# Patient Record
Sex: MALE | Race: BLACK OR AFRICAN AMERICAN | Employment: FULL TIME | URBAN - METROPOLITAN AREA
[De-identification: names, ages, dates, MRNs, and addresses within clinical notes are randomized per-mention and may not be internally consistent; named-entity substitution may affect disease eponyms.]

---

## 2017-07-19 ENCOUNTER — HOSPITAL ENCOUNTER (OUTPATIENT)
Dept: PHYSICAL THERAPY | Age: 37
Discharge: HOME | End: 2017-07-19
Attending: FAMILY MEDICINE
Payer: COMMERCIAL

## 2017-07-19 DIAGNOSIS — M54.2 NECK PAIN: ICD-10-CM

## 2017-07-19 PROCEDURE — 97140 MANUAL THERAPY 1/> REGIONS: CPT

## 2017-07-19 PROCEDURE — 97162 PT EVAL MOD COMPLEX 30 MIN: CPT

## 2017-07-19 NOTE — PROGRESS NOTES
Cynthia Gonzalez  : [facility] [address]  [phone]   [phone]       OUTPATIENT PHYSICAL THERAPY:Initial Assessment 2017    ICD-10: Treatment Diagnosis: Cervicalgia (M54.2)         Pain in left arm (M79.602)  Precautions/Allergies:   Review of patient's allergies indicates no known allergies. Fall Risk Score: 1 (? 5 = High Risk)  MD Orders: Evaluate and treat MEDICAL/REFERRING DIAGNOSIS:  Neck pain [M54.2]   DATE OF ONSET: 3 weeks prior to initial eval  REFERRING PHYSICIAN: Lilia Moore MD  RETURN PHYSICIAN APPOINTMENT: unspecified   This section established at most recent assessment  INITIAL ASSESSMENT:  Paige Patterson is a 44 y.o. male who presents to physical therapy for sudden onset of cervical pain that began about 3 weeks prior to initial eval. This session, he demonstrated slightly decreased L shoulder strength/endurance, decreased cervical mobility with associated pain, multiple postural deficits, and decreased functional mobility as evident by a score of 18/50 on the Neck Disability Index (with higher scores indicating increased disability). Pt may benefit from skilled PT to address the above listed deficits to improve ability to perform pain-free ADLs/IADLs and to improve overall quality of life prior to discharge. PROBLEM LIST (Impacting functional limitations):  1. Decreased Strength  2. Decreased ADL/Functional Activities  3. Increased Pain  4. Decreased Activity Tolerance  5. Decreased Flexibility/Joint Mobility  6. Edema/Girth INTERVENTIONS PLANNED:  1. Balance Exercise  2. Bed Mobility  3. Cold  4. Electrical Stimulation  5. Family Education  6. Heat  7. Home Exercise Program (HEP)  8. Manual Therapy  9. Neuromuscular Re-education/Strengthening  10. Range of Motion (ROM)  11. Therapeutic Activites  12. Therapeutic Exercise/Strengthening  13.  Transcutaneous Electrical Nerve Stimulation (TENS)  14. Transfer Training  15. Ultrasound (US)   TREATMENT PLAN:  Effective Dates: 7/19/2017 TO 10/19/2017. Frequency/Duration: 2 times a week for 4 weeks  GOALS: (Goals have been discussed and agreed upon with patient.)  Discharge Goals: Time Frame: 4 weeks  Pt will be compliant and independent with HEP in order to increase cervical mobility and UE strength/endurance/mobility to improve functional mobility and overall quality of life. Pt will reduce score on Neck Disability Index to 10/50 in order to demonstrate improved function during activities of daily living. Pt will demonstrate increase in B cervical rotation AROM to 90% or more of normal with minimal to no increase in pain in order to improve functional mobility and improve ability to turn head while driving. Pt will report decrease in average pain level to 3/10 pain or less in order to demonstrate improved quality of life. Rehabilitation Potential For Stated Goals: Good  Regarding Treealexey Lal Lisa's therapy, I certify that the treatment plan above will be carried out by a therapist or under their direction. Thank you for this referral,  Alyssa Martinez DPT     Referring Physician Signature: Darnell Terrazas MD              Date                    HISTORY:   History of Present Injury/Illness (Reason for Referral): *History per pt or pt's family except where otherwise noted  Pt states he was in the shower about 3 weeks ago when he reached back with his R arm \"too fast\" and felt a pop. Pt states the pain continued for several days and was getting worse, so he went to the ER (didn't do anything according to pt), but went to MD next (who did x-rays), who said that it was \"whiplash\". Pt states pain medication and muscle relaxers were not helping, so that is when he was referred to physical therapy. Pt states sleeping is painful, and he cannot turn his head or cough without sharp pain going through his L arm (R side was unaffected).  Pt states even holding a bowl of cereal with his L hand and bringing a spoon up to his mouth with his R hand brings on pain. Pt states there is a constant pain in his L cervical spine (6/10 pain), but if he moves certain ways a sharp pain radiates throughout his L UE. Pain at worst in past week has been 8/10 (aggravating activities include turning head either direction, coughing, sitting up in bed from lying on back). Pain at best is 5/10 (eases pain by massaging neck). Past Medical History/Comorbidities:   Mr. Estephania Plasencia  has a past medical history of Chronic kidney disease (CKD), stage II (mild) (10/13/2010); Essential hypertension, malignant (10/11/2010); Hypertension; Morbid obesity ([facility] [address] (10/11/2010); EILEEN (obstructive sleep apnea) (10/11/2010); EILEEN (obstructive sleep apnea) (10/11/2010); Primary pulmonary hypertension ([facility] [address] (10/11/2010); Severe uncontrolled hypertension (10/11/2010); and Tobacco abuse (10/11/2010). Mr. Estephania Plasencia  has no past surgical history on file. Social History/Living Environment:    lives with mom (but pt states he is able to do all chores, the pain is just constant)  Prior Level of Function/Work/Activity:  Works in [employer] on [employer] (has difficulty backing up due to being unable to turn his head without pain); Dominant Side:         RIGHT  Other Clinical Tests:          X-ray of cervical spine  Previous Treatment Approaches:          Tried pain medication and muscle relaxers - did not help  Personal Factors:          Sex:  male        Age:  44 y.o. Current Medications:    Current Outpatient Prescriptions:     methylPREDNISolone (MEDROL DOSEPACK) 4 mg tablet, Take as directed in package. , Disp: 1 Dose Pack, Rfl: 0    sildenafil citrate (VIAGRA) 50 mg tablet, Take 1 Tab by mouth as needed. Indications: Erectile Dysfunction, Disp: 9 Tab, Rfl: 11    carisoprodol (SOMA) 350 mg tablet, Take 1 Tab by mouth every eight (8) hours as needed for Muscle Spasm(s).  Max Daily Amount: 1,050 mg., Disp: 21 Tab, Rfl: 0    HYDROcodone-acetaminophen (NORCO) 5-325 mg per tablet, Take 1 Tab by mouth every six (6) hours as needed for Pain for up to 30 days. Max Daily Amount: 4 Tabs. Indications: Pain, Disp: 28 Tab, Rfl: 0    cloNIDine HCl (CATAPRES) 0.1 mg tablet, Take 1 Tab by mouth two (2) times a day. Indications: Hypertension, Disp: 30 Tab, Rfl: 0    diclofenac potassium (CATAFLAM) 50 mg tablet, Take 1 Tab by mouth three (3) times daily. , Disp: 21 Tab, Rfl: 0    hydrocortisone (ANUSOL-HC) 2.5 % rectal cream, Insert  into rectum four (4) times daily. , Disp: 30 g, Rfl: 0    lisinopril-hydrochlorothiazide (PRINZIDE, ZESTORETIC) 20-12.5 mg per tablet, Take 2 Tabs by mouth daily. Indications: HYPERTENSION, Disp: 180 Tab, Rfl: 3    amLODIPine (NORVASC) 10 mg tablet, Take 1 Tab by mouth daily.  Indications: HYPERTENSION, Disp: 90 Tab, Rfl: 3   Date Last Reviewed:  7/19/2017   # of Personal Factors/Comorbidities that affect the Plan of Care: 1-2: MODERATE COMPLEXITY   EXAMINATION:   Initial assessment on 7/19/2017  Observation/Orthostatic Postural Assessment:          Kyphotic posture, forward head, rounded shoulders, posterior pelvic tilt in sitting  Palpation:          Significant tightness and tenderness throughout B upper traps/deltoids and levator scapulae; stiffness with central PA mobilizations noted throughout thoracic and cervical spine  ROM:    Initial measurement: (on 7/19/2017) Initial measurement: (on 7/19/2017) Reassessment measurement:  Reassessment measurement:    L UE AROM:  Shoulder flexion: [facility]  Shoulder abduction: WFL, but pain at end range in L cervical spine  Shoulder extension: [facility]  Shoulder ER: [facility]  Shoulder IR: level of T11 (pain in L upper trap radiating down L UE to elbow)    R UE AROM:  Shoulder flexion: [facility]  Shoulder abduction: [facility]  Shoulder extension: [facility]  Shoulder ER: [facility]  Shoulder IR: level of T8        Initial measurement: (on 7/19/2017) Reassessment measurement: Reassessment measurement: Cervical AROM  Flexion (% of normal): 70% (pain in L cervical spine)  Extension (% of normal): 30% (slight pain in L cervical spine)  L sidebending (% of normal): 50% (pain in L cervical spine with radiating pain into L upper trap)  R sidebending (% of normal): 70% (slight pain in L cervical spine)  L rotation (% of normal): 80% (pain in L cervical spine)  R rotation (% of normal): 85% (slight pain in L cervical spine)    Thoracic AROM  Flexion (% of normal): 80% (pain in L cervical spine)  Extension (% of normal):  50%   L sidebending (% of normal): 100%  R sidebending (% of normal): 100%  L rotation (% of normal): 100%  R rotation (% of normal): 100%            Strength:    Initial measurement: (on 7/19/2017) Initial measurement: (on 7/19/2017) Reassessment measurement:  Reassessment measurement:    L UE:  Shoulder flexion: 4/5 (slight pain in L upper trap)  Shoulder extension: 5/5  Shoulder abduction: 4/5  Shoulder adduction: 5/5  Shoulder IR: 5/5  Shoulder ER: 4/5  Elbow flexion: 5/5  Elbow extension: 5/5  Wrist flexion/extension: 5/5 R UE:  Shoulder flexion: 4+/5  Shoulder extension: 5/5  Shoulder abduction: 5/5  Shoulder adduction: 5/5  Shoulder IR: 5/5  Shoulder ER: 4+/5  Elbow flexion: 5/5  Elbow extension: 5/5  Wrist flexion/extension: 5/5       Special Tests:          Cervical compression/distraction: -  Spurlings: + on L for radiating pain down L upper trap, - on R  Neurological Screen:        Myotomes:  WFL        Dermatomes:  Slightly decreased sensation at C4 on L side     Body Structures Involved:  1. Nerves  2. Bones  3. Joints  4. Muscles  5. Ligaments Body Functions Affected:  1. Sensory/Pain  2. Neuromusculoskeletal  3. Movement Related Activities and Participation Affected:  1. General Tasks and Demands  2. Mobility  3. Self Care  4. Domestic Life  5. Interpersonal Interactions and Relationships  6.  Community, Social and [place]   # of elements that affect the Plan of Care: 4+: HIGH COMPLEXITY   CLINICAL PRESENTATION:   Presentation: Evolving clinical presentation with changing clinical characteristics: MODERATE COMPLEXITY   CLINICAL DECISION MAKING:   Outcome Measure: Tool Used: Neck Disability Index (NDI)  Score:  Initial: 18/50  Most Recent: X/50 (Date: -- )   Interpretation of Score: The Neck Disability Index is a revised form of the Oswestry Low Back Pain Index and is designed to measure the activities of daily living in person's with neck pain. Each section is scored on a 0-5 scale, 5 representing the greatest disability. The scores of each section are added together for a total score of 50. Score 0 1-10 11-20 21-30 31-40 41-49 50   Modifier CH CI CJ CK CL CM CN     Payor: BLUE CROSS / Plan: [place] BLUE CROSS [facility] / Product Type: PPO /   Medical Necessity:   · Patient is expected to demonstrate progress in strength, range of motion and functional technique to improve ability to perform pain-free ADLs/IADLs and to improve overall quality of life. · Patient demonstrates good rehab potential due to higher previous functional level. Reason for Services/Other Comments:  · Patient continues to require modification of therapeutic interventions to increase complexity of exercises. Use of outcome tool(s) and clinical judgement create a POC that gives a: Questionable prediction of patient's progress: MODERATE COMPLEXITY   TREATMENT:   (In addition to Assessment/Re-Assessment sessions the following treatments were rendered)  Subjective comments at beginning of session: See history above. MANUAL THERAPY: (17 minutes): Joint mobilization and Soft tissue mobilization was utilized and necessary because of the patient's restricted joint motion, loss of articular motion and restricted motion of soft tissue. With pt in supine position, upward glide mobilizations (grade 2-3) performed to C3-5 to improve cervical mobility and reduce muscular tightness and pain.  With pt in prone position, central PA mobilizations (grade 3-4) performed to C7-T7 to improve thoracic and cervical mobility and reduce tightness and pain. With pt in sitting position, muscle energy technique performed with stabilization at C2 (cues for pt to perform resisted R cervical rotation while positioned in maximal L rotation, followed by further passive movement into additional L cervical rotation) to improve cervical mobility. With pt in sitting position, active movement performed into maximal L cervical rotation multiple bouts with continuous unilateral PA mobilization at L C4 to improve cervical mobility and reduce pain. Treatment/Session Assessment:  See assessment above. · Pain/ Symptoms: Initial:   6/10 Post Session:  6/10 ·   Compliance with Program/Exercises: Will assess as treatment progresses. · Recommendations/Intent for next treatment session: \"Next visit will focus on advancements to more challenging activities and reduction in assistance provided\".   Total Treatment Duration:  PT Patient Time In/Time Out  Time In: 1000  Time Out: 3 Binh Kwong, LEO

## 2017-07-19 NOTE — PROGRESS NOTES
Ambulatory/Rehab Services H2 Model Falls Risk Assessment    Risk Factor Pts. ·   Confusion/Disorientation/Impulsivity  []    4 ·   Symptomatic Depression  []   2 ·   Altered Elimination  []   1 ·   Dizziness/Vertigo  []   1 ·   Gender (Male)  [x]   1 ·   Any administered antiepileptics (anticonvulsants):  []   2 ·   Any administered benzodiazepines:  []   1 ·   Visual Impairment (specify):  []   1 ·   Portable Oxygen Use  []   1 ·   Orthostatic ? BP  []   1 ·   History of Recent Falls (within 3 mos.)  []   5     Ability to Rise from Chair (choose one) Pts. ·   Ability to rise in a single movement  [x]   0 ·   Pushes up, successful in one attempt  []   1 ·   Multiple attempts, but successful  []   3 ·   Unable to rise without assistance  []   4   Total: (5 or greater = High Risk) 1     Falls Prevention Plan:   []                Physical Limitations to Exercise (specify):   []                Mobility Assistance Device (type):   []                Exercise/Equipment Adaptation (specify):    ©2010 [facility] [address] Patent #[id number].  Federal Law prohibits the replication, distribution or use without written permission from [facility] [employer]

## 2017-07-26 ENCOUNTER — HOSPITAL ENCOUNTER (OUTPATIENT)
Dept: PHYSICAL THERAPY | Age: 37
Discharge: HOME | End: 2017-07-26
Attending: FAMILY MEDICINE
Payer: COMMERCIAL

## 2017-07-26 PROCEDURE — 97110 THERAPEUTIC EXERCISES: CPT

## 2017-07-26 PROCEDURE — 97140 MANUAL THERAPY 1/> REGIONS: CPT

## 2017-07-26 NOTE — PROGRESS NOTES
Aydin Gonzalez  : [facility] [address]  [phone]   [phone]       OUTPATIENT PHYSICAL THERAPY:Daily Note 2017    ICD-10: Treatment Diagnosis: Cervicalgia (M54.2)         Pain in left arm (M79.602)  Precautions/Allergies:   Review of patient's allergies indicates no known allergies. Fall Risk Score: 1 (? 5 = High Risk)  MD Orders: Evaluate and treat MEDICAL/REFERRING DIAGNOSIS:  Neck pain [M54.2]   DATE OF ONSET: 3 weeks prior to initial eval  REFERRING PHYSICIAN: Tristin Lawson MD  RETURN PHYSICIAN APPOINTMENT: unspecified   This section established at most recent assessment  INITIAL ASSESSMENT:  Cl Kincaid is a 44 y.o. male who presents to physical therapy for sudden onset of cervical pain that began about 3 weeks prior to initial eval. This session, he demonstrated slightly decreased L shoulder strength/endurance, decreased cervical mobility with associated pain, multiple postural deficits, and decreased functional mobility as evident by a score of 18/50 on the Neck Disability Index (with higher scores indicating increased disability). Pt may benefit from skilled PT to address the above listed deficits to improve ability to perform pain-free ADLs/IADLs and to improve overall quality of life prior to discharge. PROBLEM LIST (Impacting functional limitations):  1. Decreased Strength  2. Decreased ADL/Functional Activities  3. Increased Pain  4. Decreased Activity Tolerance  5. Decreased Flexibility/Joint Mobility  6. Edema/Girth INTERVENTIONS PLANNED:  1. Balance Exercise  2. Bed Mobility  3. Cold  4. Electrical Stimulation  5. Family Education  6. Heat  7. Home Exercise Program (HEP)  8. Manual Therapy  9. Neuromuscular Re-education/Strengthening  10. Range of Motion (ROM)  11. Therapeutic Activites  12. Therapeutic Exercise/Strengthening  13.  Transcutaneous Electrical Nerve Stimulation (TENS)  14. Transfer Training  15. Ultrasound (US)   TREATMENT PLAN:  Effective Dates: 7/19/2017 TO 10/19/2017. Frequency/Duration: 2 times a week for 4 weeks  GOALS: (Goals have been discussed and agreed upon with patient.)  Discharge Goals: Time Frame: 4 weeks  Pt will be compliant and independent with HEP in order to increase cervical mobility and UE strength/endurance/mobility to improve functional mobility and overall quality of life. Pt will reduce score on Neck Disability Index to 10/50 in order to demonstrate improved function during activities of daily living. Pt will demonstrate increase in B cervical rotation AROM to 90% or more of normal with minimal to no increase in pain in order to improve functional mobility and improve ability to turn head while driving. Pt will report decrease in average pain level to 3/10 pain or less in order to demonstrate improved quality of life. Rehabilitation Potential For Stated Goals: Good  Regarding Gus Gonzalez's therapy, I certify that the treatment plan above will be carried out by a therapist or under their direction. Thank you for this referral,  Jessica Calzada DPT     Referring Physician Signature: Max Ybarra MD              Date                    HISTORY:   History of Present Injury/Illness (Reason for Referral): *History per pt or pt's family except where otherwise noted  Pt states he was in the shower about 3 weeks ago when he reached back with his R arm \"too fast\" and felt a pop. Pt states the pain continued for several days and was getting worse, so he went to the ER (didn't do anything according to pt), but went to MD next (who did x-rays), who said that it was \"whiplash\". Pt states pain medication and muscle relaxers were not helping, so that is when he was referred to physical therapy. Pt states sleeping is painful, and he cannot turn his head or cough without sharp pain going through his L arm (R side was unaffected).  Pt states even holding a bowl of cereal with his L hand and bringing a spoon up to his mouth with his R hand brings on pain. Pt states there is a constant pain in his L cervical spine (6/10 pain), but if he moves certain ways a sharp pain radiates throughout his L UE. Pain at worst in past week has been 8/10 (aggravating activities include turning head either direction, coughing, sitting up in bed from lying on back). Pain at best is 5/10 (eases pain by massaging neck). Past Medical History/Comorbidities:   Mr. Estephania Plasencia  has a past medical history of Chronic kidney disease (CKD), stage II (mild) (10/13/2010); Essential hypertension, malignant (10/11/2010); Hypertension; Morbid obesity ([facility] [address] (10/11/2010); EILEEN (obstructive sleep apnea) (10/11/2010); EILEEN (obstructive sleep apnea) (10/11/2010); Primary pulmonary hypertension ([facility] [address] (10/11/2010); Severe uncontrolled hypertension (10/11/2010); and Tobacco abuse (10/11/2010). Mr. Estephania Plasencia  has no past surgical history on file. Social History/Living Environment:    lives with mom (but pt states he is able to do all chores, the pain is just constant)  Prior Level of Function/Work/Activity:  Works in [employer] on [employer] (has difficulty backing up due to being unable to turn his head without pain); Dominant Side:         RIGHT  Other Clinical Tests:          X-ray of cervical spine  Previous Treatment Approaches:          Tried pain medication and muscle relaxers - did not help  Personal Factors:          Sex:  male        Age:  44 y.o. Current Medications:    Current Outpatient Prescriptions:     methylPREDNISolone (MEDROL DOSEPACK) 4 mg tablet, Take as directed in package. , Disp: 1 Dose Pack, Rfl: 0    sildenafil citrate (VIAGRA) 50 mg tablet, Take 1 Tab by mouth as needed. Indications: Erectile Dysfunction, Disp: 9 Tab, Rfl: 11    carisoprodol (SOMA) 350 mg tablet, Take 1 Tab by mouth every eight (8) hours as needed for Muscle Spasm(s).  Max Daily Amount: 1,050 mg., Disp: 21 Tab, Rfl: 0    HYDROcodone-acetaminophen (NORCO) 5-325 mg per tablet, Take 1 Tab by mouth every six (6) hours as needed for Pain for up to 30 days. Max Daily Amount: 4 Tabs. Indications: Pain, Disp: 28 Tab, Rfl: 0    cloNIDine HCl (CATAPRES) 0.1 mg tablet, Take 1 Tab by mouth two (2) times a day. Indications: Hypertension, Disp: 30 Tab, Rfl: 0    diclofenac potassium (CATAFLAM) 50 mg tablet, Take 1 Tab by mouth three (3) times daily. , Disp: 21 Tab, Rfl: 0    hydrocortisone (ANUSOL-HC) 2.5 % rectal cream, Insert  into rectum four (4) times daily. , Disp: 30 g, Rfl: 0    lisinopril-hydrochlorothiazide (PRINZIDE, ZESTORETIC) 20-12.5 mg per tablet, Take 2 Tabs by mouth daily. Indications: HYPERTENSION, Disp: 180 Tab, Rfl: 3    amLODIPine (NORVASC) 10 mg tablet, Take 1 Tab by mouth daily.  Indications: HYPERTENSION, Disp: 90 Tab, Rfl: 3   Date Last Reviewed:  7/26/2017   # of Personal Factors/Comorbidities that affect the Plan of Care: 1-2: MODERATE COMPLEXITY   EXAMINATION:   Initial assessment on 7/19/2017  Observation/Orthostatic Postural Assessment:          Kyphotic posture, forward head, rounded shoulders, posterior pelvic tilt in sitting  Palpation:          Significant tightness and tenderness throughout B upper traps/deltoids and levator scapulae; stiffness with central PA mobilizations noted throughout thoracic and cervical spine  ROM:    Initial measurement: (on 7/19/2017) Initial measurement: (on 7/19/2017) Reassessment measurement:  Reassessment measurement:    L UE AROM:  Shoulder flexion: [facility]  Shoulder abduction: WFL, but pain at end range in L cervical spine  Shoulder extension: [facility]  Shoulder ER: [facility]  Shoulder IR: level of T11 (pain in L upper trap radiating down L UE to elbow)    R UE AROM:  Shoulder flexion: [facility]  Shoulder abduction: [facility]  Shoulder extension: [facility]  Shoulder ER: [facility]  Shoulder IR: level of T8        Initial measurement: (on 7/19/2017) Reassessment measurement: Reassessment measurement: Cervical AROM  Flexion (% of normal): 70% (pain in L cervical spine)  Extension (% of normal): 30% (slight pain in L cervical spine)  L sidebending (% of normal): 50% (pain in L cervical spine with radiating pain into L upper trap)  R sidebending (% of normal): 70% (slight pain in L cervical spine)  L rotation (% of normal): 80% (pain in L cervical spine)  R rotation (% of normal): 85% (slight pain in L cervical spine)    Thoracic AROM  Flexion (% of normal): 80% (pain in L cervical spine)  Extension (% of normal):  50%   L sidebending (% of normal): 100%  R sidebending (% of normal): 100%  L rotation (% of normal): 100%  R rotation (% of normal): 100%            Strength:    Initial measurement: (on 7/19/2017) Initial measurement: (on 7/19/2017) Reassessment measurement:  Reassessment measurement:    L UE:  Shoulder flexion: 4/5 (slight pain in L upper trap)  Shoulder extension: 5/5  Shoulder abduction: 4/5  Shoulder adduction: 5/5  Shoulder IR: 5/5  Shoulder ER: 4/5  Elbow flexion: 5/5  Elbow extension: 5/5  Wrist flexion/extension: 5/5 R UE:  Shoulder flexion: 4+/5  Shoulder extension: 5/5  Shoulder abduction: 5/5  Shoulder adduction: 5/5  Shoulder IR: 5/5  Shoulder ER: 4+/5  Elbow flexion: 5/5  Elbow extension: 5/5  Wrist flexion/extension: 5/5       Special Tests:          Cervical compression/distraction: -  Spurlings: + on L for radiating pain down L upper trap, - on R  Neurological Screen:        Myotomes:  WFL        Dermatomes:  Slightly decreased sensation at C4 on L side     Body Structures Involved:  1. Nerves  2. Bones  3. Joints  4. Muscles  5. Ligaments Body Functions Affected:  1. Sensory/Pain  2. Neuromusculoskeletal  3. Movement Related Activities and Participation Affected:  1. General Tasks and Demands  2. Mobility  3. Self Care  4. Domestic Life  5. Interpersonal Interactions and Relationships  6.  Community, Social and [place]   # of elements that affect the Plan of Care: 4+: HIGH COMPLEXITY   CLINICAL PRESENTATION:   Presentation: Evolving clinical presentation with changing clinical characteristics: MODERATE COMPLEXITY   CLINICAL DECISION MAKING:   Outcome Measure: Tool Used: Neck Disability Index (NDI)  Score:  Initial: 18/50  Most Recent: X/50 (Date: -- )   Interpretation of Score: The Neck Disability Index is a revised form of the Oswestry Low Back Pain Index and is designed to measure the activities of daily living in person's with neck pain. Each section is scored on a 0-5 scale, 5 representing the greatest disability. The scores of each section are added together for a total score of 50. Score 0 1-10 11-20 21-30 31-40 41-49 50   Modifier CH CI CJ CK CL CM CN     Payor: BLUE CROSS / Plan: [place] BLUE CROSS [facility] / Product Type: PPO /   Medical Necessity:   · Patient is expected to demonstrate progress in strength, range of motion and functional technique to improve ability to perform pain-free ADLs/IADLs and to improve overall quality of life. · Patient demonstrates good rehab potential due to higher previous functional level. Reason for Services/Other Comments:  · Patient continues to require modification of therapeutic interventions to increase complexity of exercises. Use of outcome tool(s) and clinical judgement create a POC that gives a: Questionable prediction of patient's progress: MODERATE COMPLEXITY   TREATMENT:   (In addition to Assessment/Re-Assessment sessions the following treatments were rendered)  Subjective comments at beginning of session: Pt stating slightly decreased pain this session. Therapeutic Exercise: ( 8 minutes):  Exercises per grid below to improve mobility, strength and dynamic movement of neck - both sides to improve functional lifting, carrying, reaching and overhead activites. Required minimal visual, verbal and manual cues to promote proper body alignment, promote proper body posture and promote proper body mechanics.   Progressed resistance, range, repetitions and complexity of movement as indicated. Date:  7/26/2017 Date:   Date:   Activity/Exercise Parameters Parameters    UBE L2 resistance; 4 minutes forward and 2 minutes backward to increase strength/endurance     Lateral cervical flexion in sitting To R side only x 30 seconds x 3 reps with cues to only go within pain-free ROM                                     MANUAL THERAPY: (31 minutes): Joint mobilization and Soft tissue mobilization was utilized and necessary because of the patient's restricted joint motion, loss of articular motion and restricted motion of soft tissue. With pt in supine position, downward glide mobilizations (grade 2-3) performed to C6-7 with and without mobilization into movement to improve cervical mobility and reduce muscular tightness and pain. With pt in supine position with B LEs supported on wedge, STM and trigger point release to L>R upper traps/deltoids, levator scapulae, and cervical/thoracic paraspinals and gentle manual cervical distraction to reduce muscular tightness and pain. Treatment/Session Assessment:  Pt demonstrating improved cervical mobility with decreased pain at end of session. · Pain/ Symptoms: Initial:   5/10 Post Session:  4/10 ·   Compliance with Program/Exercises: Will assess as treatment progresses. · Recommendations/Intent for next treatment session: \"Next visit will focus on advancements to more challenging activities and reduction in assistance provided\".   Total Treatment Duration:  PT Patient Time In/Time Out  Time In: 1017  Time Out: 1008 [facility]

## 2017-07-28 ENCOUNTER — HOSPITAL ENCOUNTER (OUTPATIENT)
Dept: PHYSICAL THERAPY | Age: 37
Discharge: HOME | End: 2017-07-28
Attending: FAMILY MEDICINE
Payer: COMMERCIAL

## 2017-07-28 PROCEDURE — 97140 MANUAL THERAPY 1/> REGIONS: CPT

## 2017-07-28 PROCEDURE — 97110 THERAPEUTIC EXERCISES: CPT

## 2017-07-28 NOTE — PROGRESS NOTES
Teagan Gonzalez  : [facility] [address]  [phone]   [phone]       OUTPATIENT PHYSICAL THERAPY:Daily Note 2017    ICD-10: Treatment Diagnosis: Cervicalgia (M54.2)         Pain in left arm (M79.602)  Precautions/Allergies:   Review of patient's allergies indicates no known allergies. Fall Risk Score: 1 (? 5 = High Risk)  MD Orders: Evaluate and treat MEDICAL/REFERRING DIAGNOSIS:  Neck pain [M54.2]   DATE OF ONSET: 3 weeks prior to initial eval  REFERRING PHYSICIAN: Bessy Mcfarland MD  RETURN PHYSICIAN APPOINTMENT: unspecified   This section established at most recent assessment  INITIAL ASSESSMENT:  Urban Klein is a 44 y.o. male who presents to physical therapy for sudden onset of cervical pain that began about 3 weeks prior to initial eval. This session, he demonstrated slightly decreased L shoulder strength/endurance, decreased cervical mobility with associated pain, multiple postural deficits, and decreased functional mobility as evident by a score of 18/50 on the Neck Disability Index (with higher scores indicating increased disability). Pt may benefit from skilled PT to address the above listed deficits to improve ability to perform pain-free ADLs/IADLs and to improve overall quality of life prior to discharge. PROBLEM LIST (Impacting functional limitations):  1. Decreased Strength  2. Decreased ADL/Functional Activities  3. Increased Pain  4. Decreased Activity Tolerance  5. Decreased Flexibility/Joint Mobility  6. Edema/Girth INTERVENTIONS PLANNED:  1. Balance Exercise  2. Bed Mobility  3. Cold  4. Electrical Stimulation  5. Family Education  6. Heat  7. Home Exercise Program (HEP)  8. Manual Therapy  9. Neuromuscular Re-education/Strengthening  10. Range of Motion (ROM)  11. Therapeutic Activites  12. Therapeutic Exercise/Strengthening  13.  Transcutaneous Electrical Nerve Stimulation (TENS)  14. Transfer Training  15. Ultrasound (US)   TREATMENT PLAN:  Effective Dates: 7/19/2017 TO 10/19/2017. Frequency/Duration: 2 times a week for 4 weeks  GOALS: (Goals have been discussed and agreed upon with patient.)  Discharge Goals: Time Frame: 4 weeks  Pt will be compliant and independent with HEP in order to increase cervical mobility and UE strength/endurance/mobility to improve functional mobility and overall quality of life. Pt will reduce score on Neck Disability Index to 10/50 in order to demonstrate improved function during activities of daily living. Pt will demonstrate increase in B cervical rotation AROM to 90% or more of normal with minimal to no increase in pain in order to improve functional mobility and improve ability to turn head while driving. Pt will report decrease in average pain level to 3/10 pain or less in order to demonstrate improved quality of life. Rehabilitation Potential For Stated Goals: Good  Regarding Cher Alvarez Lisa's therapy, I certify that the treatment plan above will be carried out by a therapist or under their direction. Thank you for this referral,  Prince Kehr, PTA     Referring Physician Signature: Fabi Jean MD              Date                    HISTORY:   History of Present Injury/Illness (Reason for Referral): *History per pt or pt's family except where otherwise noted  Pt states he was in the shower about 3 weeks ago when he reached back with his R arm \"too fast\" and felt a pop. Pt states the pain continued for several days and was getting worse, so he went to the ER (didn't do anything according to pt), but went to MD next (who did x-rays), who said that it was \"whiplash\". Pt states pain medication and muscle relaxers were not helping, so that is when he was referred to physical therapy. Pt states sleeping is painful, and he cannot turn his head or cough without sharp pain going through his L arm (R side was unaffected).  Pt states even holding a bowl of cereal with his L hand and bringing a spoon up to his mouth with his R hand brings on pain. Pt states there is a constant pain in his L cervical spine (6/10 pain), but if he moves certain ways a sharp pain radiates throughout his L UE. Pain at worst in past week has been 8/10 (aggravating activities include turning head either direction, coughing, sitting up in bed from lying on back). Pain at best is 5/10 (eases pain by massaging neck). Past Medical History/Comorbidities:   Mr. Val Awad  has a past medical history of Chronic kidney disease (CKD), stage II (mild) (10/13/2010); Essential hypertension, malignant (10/11/2010); Hypertension; Morbid obesity ([facility] [address] (10/11/2010); EILEEN (obstructive sleep apnea) (10/11/2010); EILEEN (obstructive sleep apnea) (10/11/2010); Primary pulmonary hypertension ([facility] [address] (10/11/2010); Severe uncontrolled hypertension (10/11/2010); and Tobacco abuse (10/11/2010). Mr. Val Awad  has no past surgical history on file. Social History/Living Environment:    lives with mom (but pt states he is able to do all chores, the pain is just constant)  Prior Level of Function/Work/Activity:  Works in [employer] on [employer] (has difficulty backing up due to being unable to turn his head without pain); Dominant Side:         RIGHT  Other Clinical Tests:          X-ray of cervical spine  Previous Treatment Approaches:          Tried pain medication and muscle relaxers - did not help  Personal Factors:          Sex:  male        Age:  44 y.o. Current Medications:    Current Outpatient Prescriptions:     methylPREDNISolone (MEDROL DOSEPACK) 4 mg tablet, Take as directed in package. , Disp: 1 Dose Pack, Rfl: 0    sildenafil citrate (VIAGRA) 50 mg tablet, Take 1 Tab by mouth as needed. Indications: Erectile Dysfunction, Disp: 9 Tab, Rfl: 11    carisoprodol (SOMA) 350 mg tablet, Take 1 Tab by mouth every eight (8) hours as needed for Muscle Spasm(s).  Max Daily Amount: 1,050 mg., Disp: 21 Tab, Rfl: 0    HYDROcodone-acetaminophen (NORCO) 5-325 mg per tablet, Take 1 Tab by mouth every six (6) hours as needed for Pain for up to 30 days. Max Daily Amount: 4 Tabs. Indications: Pain, Disp: 28 Tab, Rfl: 0    cloNIDine HCl (CATAPRES) 0.1 mg tablet, Take 1 Tab by mouth two (2) times a day. Indications: Hypertension, Disp: 30 Tab, Rfl: 0    diclofenac potassium (CATAFLAM) 50 mg tablet, Take 1 Tab by mouth three (3) times daily. , Disp: 21 Tab, Rfl: 0    hydrocortisone (ANUSOL-HC) 2.5 % rectal cream, Insert  into rectum four (4) times daily. , Disp: 30 g, Rfl: 0    lisinopril-hydrochlorothiazide (PRINZIDE, ZESTORETIC) 20-12.5 mg per tablet, Take 2 Tabs by mouth daily. Indications: HYPERTENSION, Disp: 180 Tab, Rfl: 3    amLODIPine (NORVASC) 10 mg tablet, Take 1 Tab by mouth daily.  Indications: HYPERTENSION, Disp: 90 Tab, Rfl: 3   Date Last Reviewed:  7/28/2017   # of Personal Factors/Comorbidities that affect the Plan of Care: 1-2: MODERATE COMPLEXITY   EXAMINATION:   Initial assessment on 7/19/2017  Observation/Orthostatic Postural Assessment:          Kyphotic posture, forward head, rounded shoulders, posterior pelvic tilt in sitting  Palpation:          Significant tightness and tenderness throughout B upper traps/deltoids and levator scapulae; stiffness with central PA mobilizations noted throughout thoracic and cervical spine  ROM:    Initial measurement: (on 7/19/2017) Initial measurement: (on 7/19/2017) Reassessment measurement:  Reassessment measurement:    L UE AROM:  Shoulder flexion: [facility]  Shoulder abduction: WFL, but pain at end range in L cervical spine  Shoulder extension: [facility]  Shoulder ER: [facility]  Shoulder IR: level of T11 (pain in L upper trap radiating down L UE to elbow)    R UE AROM:  Shoulder flexion: [facility]  Shoulder abduction: [facility]  Shoulder extension: [facility]  Shoulder ER: [facility]  Shoulder IR: level of T8        Initial measurement: (on 7/19/2017) Reassessment measurement: Reassessment measurement: Cervical AROM  Flexion (% of normal): 70% (pain in L cervical spine)  Extension (% of normal): 30% (slight pain in L cervical spine)  L sidebending (% of normal): 50% (pain in L cervical spine with radiating pain into L upper trap)  R sidebending (% of normal): 70% (slight pain in L cervical spine)  L rotation (% of normal): 80% (pain in L cervical spine)  R rotation (% of normal): 85% (slight pain in L cervical spine)    Thoracic AROM  Flexion (% of normal): 80% (pain in L cervical spine)  Extension (% of normal):  50%   L sidebending (% of normal): 100%  R sidebending (% of normal): 100%  L rotation (% of normal): 100%  R rotation (% of normal): 100%            Strength:    Initial measurement: (on 7/19/2017) Initial measurement: (on 7/19/2017) Reassessment measurement:  Reassessment measurement:    L UE:  Shoulder flexion: 4/5 (slight pain in L upper trap)  Shoulder extension: 5/5  Shoulder abduction: 4/5  Shoulder adduction: 5/5  Shoulder IR: 5/5  Shoulder ER: 4/5  Elbow flexion: 5/5  Elbow extension: 5/5  Wrist flexion/extension: 5/5 R UE:  Shoulder flexion: 4+/5  Shoulder extension: 5/5  Shoulder abduction: 5/5  Shoulder adduction: 5/5  Shoulder IR: 5/5  Shoulder ER: 4+/5  Elbow flexion: 5/5  Elbow extension: 5/5  Wrist flexion/extension: 5/5       Special Tests:          Cervical compression/distraction: -  Spurlings: + on L for radiating pain down L upper trap, - on R  Neurological Screen:        Myotomes:  WFL        Dermatomes:  Slightly decreased sensation at C4 on L side     Body Structures Involved:  1. Nerves  2. Bones  3. Joints  4. Muscles  5. Ligaments Body Functions Affected:  1. Sensory/Pain  2. Neuromusculoskeletal  3. Movement Related Activities and Participation Affected:  1. General Tasks and Demands  2. Mobility  3. Self Care  4. Domestic Life  5. Interpersonal Interactions and Relationships  6.  Community, Social and [place]   # of elements that affect the Plan of Care: 4+: HIGH COMPLEXITY   CLINICAL PRESENTATION:   Presentation: Evolving clinical presentation with changing clinical characteristics: MODERATE COMPLEXITY   CLINICAL DECISION MAKING:   Outcome Measure: Tool Used: Neck Disability Index (NDI)  Score:  Initial: 18/50  Most Recent: X/50 (Date: -- )   Interpretation of Score: The Neck Disability Index is a revised form of the Oswestry Low Back Pain Index and is designed to measure the activities of daily living in person's with neck pain. Each section is scored on a 0-5 scale, 5 representing the greatest disability. The scores of each section are added together for a total score of 50. Score 0 1-10 11-20 21-30 31-40 41-49 50   Modifier CH CI CJ CK CL CM CN     Payor: BLUE CROSS / Plan: [place] BLUE CROSS [facility] / Product Type: PPO /   Medical Necessity:   · Patient is expected to demonstrate progress in strength, range of motion and functional technique to improve ability to perform pain-free ADLs/IADLs and to improve overall quality of life. · Patient demonstrates good rehab potential due to higher previous functional level. Reason for Services/Other Comments:  · Patient continues to require modification of therapeutic interventions to increase complexity of exercises. Use of outcome tool(s) and clinical judgement create a POC that gives a: Questionable prediction of patient's progress: MODERATE COMPLEXITY   TREATMENT:   (In addition to Assessment/Re-Assessment sessions the following treatments were rendered)  Subjective comments at beginning of session: Pt reports pain continues in left upper trap/neck area and left upper back. Patient reports pain level is     Therapeutic Exercise: ( 10 minutes):  Exercises per grid below to improve mobility, strength and dynamic movement of neck - both sides to improve functional lifting, carrying, reaching and overhead activites.   Required minimal visual, verbal and manual cues to promote proper body alignment, promote proper body posture and promote proper body mechanics. Progressed resistance, range, repetitions and complexity of movement as indicated. Date:  7/26/2017 Date:  7/28/17 Date:   Activity/Exercise Parameters Parameters    UBE L2 resistance; 4 minutes forward and 2 minutes backward to increase strength/endurance Level 2   X 4 minutes forward    Lateral cervical flexion in sitting To R side only x 30 seconds x 3 reps with cues to only go within pain-free ROM     Cervical rotation  5 x 5 second hold B in comfortable range      Cervical flexion and extension  5 x 5 second hold in comfortable range      Cervical side bend  5 x 5 second hold B in comfortable range                  MODALITIES: (20 minutes total) patient sitting with left arm supported for moist heat to left upper trap area prior to manual for 10 minutes to help decrease pain and tightness. After manual patient supine with knee wedge in place for ice pack to left upper trap and upper back x 10 minutes to help decrease pain. MANUAL THERAPY: (30 minutes): Patient sitting with left arm supported for STM with hands and guasha tools to left upper trap and upper back and then with weighted bar trigger point release to right upper trap area and then patient prone for STM with guasha tools, weight bar and hands to scapular border and upper back . Weight bar used with rolling technique. Patient tolerated all this well for decrease tightness and pain and improved mobility without increased pain and with improved mobility. Continue per plan of care. Treatment/Session Assessment:  Pt demonstrating improved cervical mobility with decreased pain at end of session. · Pain/ Symptoms: Initial:   6/10 Post Session:  4/10 ·   Compliance with Program/Exercises: Will assess as treatment progresses. · Recommendations/Intent for next treatment session: \"Next visit will focus on advancements to more challenging activities and reduction in assistance provided\".   Total Treatment Duration:  PT Patient Time In/Time Out  Time In: 1345  Time Out: [address], PTA

## 2017-07-31 ENCOUNTER — HOSPITAL ENCOUNTER (OUTPATIENT)
Dept: PHYSICAL THERAPY | Age: 37
Discharge: HOME | End: 2017-07-31
Attending: FAMILY MEDICINE
Payer: COMMERCIAL

## 2017-07-31 PROCEDURE — 97110 THERAPEUTIC EXERCISES: CPT

## 2017-07-31 PROCEDURE — 97140 MANUAL THERAPY 1/> REGIONS: CPT

## 2017-07-31 NOTE — PROGRESS NOTES
Charu Gonzalez  : [facility] [address]  [phone]   [phone]       OUTPATIENT PHYSICAL THERAPY:Daily Note 2017    ICD-10: Treatment Diagnosis: Cervicalgia (M54.2)         Pain in left arm (M79.602)  Precautions/Allergies:   Review of patient's allergies indicates no known allergies. Fall Risk Score: 1 (? 5 = High Risk)  MD Orders: Evaluate and treat MEDICAL/REFERRING DIAGNOSIS:  Neck pain [M54.2]   DATE OF ONSET: 3 weeks prior to initial eval  REFERRING PHYSICIAN: Iván Matson MD  RETURN PHYSICIAN APPOINTMENT: unspecified   This section established at most recent assessment  INITIAL ASSESSMENT:  Dilshad Lowry is a 44 y.o. male who presents to physical therapy for sudden onset of cervical pain that began about 3 weeks prior to initial eval. This session, he demonstrated slightly decreased L shoulder strength/endurance, decreased cervical mobility with associated pain, multiple postural deficits, and decreased functional mobility as evident by a score of 18/50 on the Neck Disability Index (with higher scores indicating increased disability). Pt may benefit from skilled PT to address the above listed deficits to improve ability to perform pain-free ADLs/IADLs and to improve overall quality of life prior to discharge. PROBLEM LIST (Impacting functional limitations):  1. Decreased Strength  2. Decreased ADL/Functional Activities  3. Increased Pain  4. Decreased Activity Tolerance  5. Decreased Flexibility/Joint Mobility  6. Edema/Girth INTERVENTIONS PLANNED:  1. Balance Exercise  2. Bed Mobility  3. Cold  4. Electrical Stimulation  5. Family Education  6. Heat  7. Home Exercise Program (HEP)  8. Manual Therapy  9. Neuromuscular Re-education/Strengthening  10. Range of Motion (ROM)  11. Therapeutic Activites  12. Therapeutic Exercise/Strengthening  13.  Transcutaneous Electrical Nerve Stimulation (TENS)  14. Transfer Training  15. Ultrasound (US)   TREATMENT PLAN:  Effective Dates: 7/19/2017 TO 10/19/2017. Frequency/Duration: 2 times a week for 4 weeks  GOALS: (Goals have been discussed and agreed upon with patient.)  Discharge Goals: Time Frame: 4 weeks  Pt will be compliant and independent with HEP in order to increase cervical mobility and UE strength/endurance/mobility to improve functional mobility and overall quality of life. Pt will reduce score on Neck Disability Index to 10/50 in order to demonstrate improved function during activities of daily living. Pt will demonstrate increase in B cervical rotation AROM to 90% or more of normal with minimal to no increase in pain in order to improve functional mobility and improve ability to turn head while driving. Pt will report decrease in average pain level to 3/10 pain or less in order to demonstrate improved quality of life. Rehabilitation Potential For Stated Goals: Good  Regarding Carlee Gonzalez's therapy, I certify that the treatment plan above will be carried out by a therapist or under their direction. Thank you for this referral,  Chidi Shaffer PTA     Referring Physician Signature: Jossy Castaneda MD              Date                    HISTORY:   History of Present Injury/Illness (Reason for Referral): *History per pt or pt's family except where otherwise noted  Pt states he was in the shower about 3 weeks ago when he reached back with his R arm \"too fast\" and felt a pop. Pt states the pain continued for several days and was getting worse, so he went to the ER (didn't do anything according to pt), but went to MD next (who did x-rays), who said that it was \"whiplash\". Pt states pain medication and muscle relaxers were not helping, so that is when he was referred to physical therapy. Pt states sleeping is painful, and he cannot turn his head or cough without sharp pain going through his L arm (R side was unaffected).  Pt states even holding a bowl of cereal with his L hand and bringing a spoon up to his mouth with his R hand brings on pain. Pt states there is a constant pain in his L cervical spine (6/10 pain), but if he moves certain ways a sharp pain radiates throughout his L UE. Pain at worst in past week has been 8/10 (aggravating activities include turning head either direction, coughing, sitting up in bed from lying on back). Pain at best is 5/10 (eases pain by massaging neck). Past Medical History/Comorbidities:   Mr. Kaila Caba  has a past medical history of Chronic kidney disease (CKD), stage II (mild) (10/13/2010); Essential hypertension, malignant (10/11/2010); Hypertension; Morbid obesity ([facility] [address] (10/11/2010); EILEEN (obstructive sleep apnea) (10/11/2010); EILEEN (obstructive sleep apnea) (10/11/2010); Primary pulmonary hypertension ([facility] [address] (10/11/2010); Severe uncontrolled hypertension (10/11/2010); and Tobacco abuse (10/11/2010). Mr. Kaila Caba  has no past surgical history on file. Social History/Living Environment:    lives with mom (but pt states he is able to do all chores, the pain is just constant)  Prior Level of Function/Work/Activity:  Works in [employer] (has difficulty backing up due to being unable to turn his head without pain); Dominant Side:         RIGHT  Other Clinical Tests:          X-ray of cervical spine  Previous Treatment Approaches:          Tried pain medication and muscle relaxers - did not help  Personal Factors:          Sex:  male        Age:  44 y.o. Current Medications:    Current Outpatient Prescriptions:     methylPREDNISolone (MEDROL DOSEPACK) 4 mg tablet, Take as directed in package. , Disp: 1 Dose Pack, Rfl: 0    sildenafil citrate (VIAGRA) 50 mg tablet, Take 1 Tab by mouth as needed. Indications: Erectile Dysfunction, Disp: 9 Tab, Rfl: 11    carisoprodol (SOMA) 350 mg tablet, Take 1 Tab by mouth every eight (8) hours as needed for Muscle Spasm(s).  Max Daily Amount: 1,050 mg., Disp: 21 Tab, Rfl: 0    cloNIDine HCl (CATAPRES) 0.1 mg tablet, Take 1 Tab by mouth two (2) times a day. Indications: Hypertension, Disp: 30 Tab, Rfl: 0    diclofenac potassium (CATAFLAM) 50 mg tablet, Take 1 Tab by mouth three (3) times daily. , Disp: 21 Tab, Rfl: 0    hydrocortisone (ANUSOL-HC) 2.5 % rectal cream, Insert  into rectum four (4) times daily. , Disp: 30 g, Rfl: 0    lisinopril-hydrochlorothiazide (PRINZIDE, ZESTORETIC) 20-12.5 mg per tablet, Take 2 Tabs by mouth daily. Indications: HYPERTENSION, Disp: 180 Tab, Rfl: 3    amLODIPine (NORVASC) 10 mg tablet, Take 1 Tab by mouth daily.  Indications: HYPERTENSION, Disp: 90 Tab, Rfl: 3   Date Last Reviewed:  7/31/2017   # of Personal Factors/Comorbidities that affect the Plan of Care: 1-2: MODERATE COMPLEXITY   EXAMINATION:   Initial assessment on 7/19/2017  Observation/Orthostatic Postural Assessment:          Kyphotic posture, forward head, rounded shoulders, posterior pelvic tilt in sitting  Palpation:          Significant tightness and tenderness throughout B upper traps/deltoids and levator scapulae; stiffness with central PA mobilizations noted throughout thoracic and cervical spine  ROM:    Initial measurement: (on 7/19/2017) Initial measurement: (on 7/19/2017) Reassessment measurement:  Reassessment measurement:    L UE AROM:  Shoulder flexion: [facility]  Shoulder abduction: WFL, but pain at end range in L cervical spine  Shoulder extension: [facility]  Shoulder ER: [facility]  Shoulder IR: level of T11 (pain in L upper trap radiating down L UE to elbow)    R UE AROM:  Shoulder flexion: [facility]  Shoulder abduction: [facility]  Shoulder extension: [facility]  Shoulder ER: [facility]  Shoulder IR: level of T8        Initial measurement: (on 7/19/2017) Reassessment measurement: Reassessment measurement:    Cervical AROM  Flexion (% of normal): 70% (pain in L cervical spine)  Extension (% of normal): 30% (slight pain in L cervical spine)  L sidebending (% of normal): 50% (pain in L cervical spine with radiating pain into L upper trap)  R sidebending (% of normal): 70% (slight pain in L cervical spine)  L rotation (% of normal): 80% (pain in L cervical spine)  R rotation (% of normal): 85% (slight pain in L cervical spine)    Thoracic AROM  Flexion (% of normal): 80% (pain in L cervical spine)  Extension (% of normal):  50%   L sidebending (% of normal): 100%  R sidebending (% of normal): 100%  L rotation (% of normal): 100%  R rotation (% of normal): 100%            Strength:    Initial measurement: (on 7/19/2017) Initial measurement: (on 7/19/2017) Reassessment measurement:  Reassessment measurement:    L UE:  Shoulder flexion: 4/5 (slight pain in L upper trap)  Shoulder extension: 5/5  Shoulder abduction: 4/5  Shoulder adduction: 5/5  Shoulder IR: 5/5  Shoulder ER: 4/5  Elbow flexion: 5/5  Elbow extension: 5/5  Wrist flexion/extension: 5/5 R UE:  Shoulder flexion: 4+/5  Shoulder extension: 5/5  Shoulder abduction: 5/5  Shoulder adduction: 5/5  Shoulder IR: 5/5  Shoulder ER: 4+/5  Elbow flexion: 5/5  Elbow extension: 5/5  Wrist flexion/extension: 5/5       Special Tests:          Cervical compression/distraction: -  Spurlings: + on L for radiating pain down L upper trap, - on R  Neurological Screen:        Myotomes:  WFL        Dermatomes:  Slightly decreased sensation at C4 on L side     Body Structures Involved:  1. Nerves  2. Bones  3. Joints  4. Muscles  5. Ligaments Body Functions Affected:  1. Sensory/Pain  2. Neuromusculoskeletal  3. Movement Related Activities and Participation Affected:  1. General Tasks and Demands  2. Mobility  3. Self Care  4. Domestic Life  5. Interpersonal Interactions and Relationships  6. Community, Social and [place]   # of elements that affect the Plan of Care: 4+: HIGH COMPLEXITY   CLINICAL PRESENTATION:   Presentation: Evolving clinical presentation with changing clinical characteristics: MODERATE COMPLEXITY   CLINICAL DECISION MAKING:   Outcome Measure:    Tool Used: Neck Disability Index (NDI)  Score:  Initial: 18/50  Most Recent: X/50 (Date: -- )   Interpretation of Score: The Neck Disability Index is a revised form of the Oswestry Low Back Pain Index and is designed to measure the activities of daily living in person's with neck pain. Each section is scored on a 0-5 scale, 5 representing the greatest disability. The scores of each section are added together for a total score of 50. Score 0 1-10 11-20 21-30 31-40 41-49 50   Modifier CH CI CJ CK CL CM CN     Payor: BLUE CROSS / Plan: [place] BLUE CROSS [facility] / Product Type: PPO /   Medical Necessity:   · Patient is expected to demonstrate progress in strength, range of motion and functional technique to improve ability to perform pain-free ADLs/IADLs and to improve overall quality of life. · Patient demonstrates good rehab potential due to higher previous functional level. Reason for Services/Other Comments:  · Patient continues to require modification of therapeutic interventions to increase complexity of exercises. Use of outcome tool(s) and clinical judgement create a POC that gives a: Questionable prediction of patient's progress: MODERATE COMPLEXITY   TREATMENT:   (In addition to Assessment/Re-Assessment sessions the following treatments were rendered)  Subjective comments at beginning of session: Pt reports pain continues in left upper trap/neck area and left upper back. Patient reports pain level is     Therapeutic Exercise: ( 10 minutes):  Exercises per grid below to improve mobility, strength and dynamic movement of neck - both sides to improve functional lifting, carrying, reaching and overhead activites. Required minimal visual, verbal and manual cues to promote proper body alignment, promote proper body posture and promote proper body mechanics. Progressed resistance, range, repetitions and complexity of movement as indicated.     Date:  7/26/2017 Date:  7/28/17 Date:  7/31/17   Activity/Exercise Parameters Parameters    UBE L2 resistance; 4 minutes forward and 2 minutes backward to increase strength/endurance Level 2   X 4 minutes forward x   Lateral cervical flexion in sitting To R side only x 30 seconds x 3 reps with cues to only go within pain-free ROM  5 x 10 second hold B   Cervical rotation  5 x 5 second hold B in comfortable range   5 x 5 second hold B in comfortable range   Cervical flexion and extension  5 x 5 second hold in comfortable range      Cervical side bend  5 x 5 second hold B in comfortable range 5 x 5 second hold B in comfortable range                 MODALITIES: (20 minutes total) patient sitting with left arm supported for moist heat to left upper trap area prior to manual for 10 minutes to help decrease pain and tightness. After manual patient sitting with arm supported for ice pack x 10 minutes to left neck, upper trap and upper left back, scapula area. MANUAL THERAPY: (30 minutes): Patient sitting with left arm supported for STM with hands and guasha tools to left upper trap and upper back and then with weighted bar trigger point release to right upper trap area and then patient prone for STM with guasha tools, weight bar and hands to scapular border and upper back . Weight bar used with rolling technique and with end of bar for trigger point release in 2 point tender areas, one along the scapular border Patient tolerated all this well for decrease tightness and pain and improved mobility without increased pain and with improved mobility. Continue per plan of care. Treatment/Session Assessment:  Pt demonstrating improved cervical mobility with decreased pain at end of session. · Pain/ Symptoms: Initial:   4/10 Post Session:  2/10 with improved cervical mobility  ·   Compliance with Program/Exercises: Will assess as treatment progresses. · Recommendations/Intent for next treatment session:  \"Next visit will focus on advancements to more challenging activities and reduction in assistance provided\".   Total Treatment Duration:  PT Patient Time In/Time Out  Time In: 1100  Time Out: [address] Fuentes

## 2017-08-02 ENCOUNTER — HOSPITAL ENCOUNTER (OUTPATIENT)
Dept: PHYSICAL THERAPY | Age: 37
Discharge: HOME | End: 2017-08-02
Attending: FAMILY MEDICINE
Payer: COMMERCIAL

## 2017-08-02 PROCEDURE — 97110 THERAPEUTIC EXERCISES: CPT

## 2017-08-02 PROCEDURE — 97140 MANUAL THERAPY 1/> REGIONS: CPT

## 2017-08-02 NOTE — PROGRESS NOTES
Amanda Gonzalez  : [facility] [address] [place] [address]  [phone]   [phone]       OUTPATIENT PHYSICAL THERAPY:Daily Note 2017    ICD-10: Treatment Diagnosis: Cervicalgia (M54.2)         Pain in left arm (M79.602)  Precautions/Allergies:   Review of patient's allergies indicates no known allergies. Fall Risk Score: 1 (? 5 = High Risk)  MD Orders: Evaluate and treat MEDICAL/REFERRING DIAGNOSIS:  Neck pain [M54.2]   DATE OF ONSET: 3 weeks prior to initial eval  REFERRING PHYSICIAN: Jaz Mesa MD  RETURN PHYSICIAN APPOINTMENT: unspecified   This section established at most recent assessment  INITIAL ASSESSMENT:  Herve Snyder is a 44 y.o. male who presents to physical therapy for sudden onset of cervical pain that began about 3 weeks prior to initial eval. This session, he demonstrated slightly decreased L shoulder strength/endurance, decreased cervical mobility with associated pain, multiple postural deficits, and decreased functional mobility as evident by a score of 18/50 on the Neck Disability Index (with higher scores indicating increased disability). Pt may benefit from skilled PT to address the above listed deficits to improve ability to perform pain-free ADLs/IADLs and to improve overall quality of life prior to discharge. PROBLEM LIST (Impacting functional limitations):  1. Decreased Strength  2. Decreased ADL/Functional Activities  3. Increased Pain  4. Decreased Activity Tolerance  5. Decreased Flexibility/Joint Mobility  6. Edema/Girth INTERVENTIONS PLANNED:  1. Balance Exercise  2. Bed Mobility  3. Cold  4. Electrical Stimulation  5. Family Education  6. Heat  7. Home Exercise Program (HEP)  8. Manual Therapy  9. Neuromuscular Re-education/Strengthening  10. Range of Motion (ROM)  11. Therapeutic Activites  12. Therapeutic Exercise/Strengthening  13.  Transcutaneous Electrical Nerve Stimulation (TENS)  14. Transfer Training  15. Ultrasound (US)   TREATMENT PLAN:  Effective Dates: 7/19/2017 TO 10/19/2017. Frequency/Duration: 2 times a week for 4 weeks  GOALS: (Goals have been discussed and agreed upon with patient.)  Discharge Goals: Time Frame: 4 weeks  Pt will be compliant and independent with HEP in order to increase cervical mobility and UE strength/endurance/mobility to improve functional mobility and overall quality of life. Pt will reduce score on Neck Disability Index to 10/50 in order to demonstrate improved function during activities of daily living. Pt will demonstrate increase in B cervical rotation AROM to 90% or more of normal with minimal to no increase in pain in order to improve functional mobility and improve ability to turn head while driving. Pt will report decrease in average pain level to 3/10 pain or less in order to demonstrate improved quality of life. Rehabilitation Potential For Stated Goals: Good  Regarding Samanta Gonzalez's therapy, I certify that the treatment plan above will be carried out by a therapist or under their direction. Thank you for this referral,  Elle Quesada PTA     Referring Physician Signature: Edd Fleischer, MD              Date                    HISTORY:   History of Present Injury/Illness (Reason for Referral): *History per pt or pt's family except where otherwise noted  Pt states he was in the shower about 3 weeks ago when he reached back with his R arm \"too fast\" and felt a pop. Pt states the pain continued for several days and was getting worse, so he went to the ER (didn't do anything according to pt), but went to MD next (who did x-rays), who said that it was \"whiplash\". Pt states pain medication and muscle relaxers were not helping, so that is when he was referred to physical therapy. Pt states sleeping is painful, and he cannot turn his head or cough without sharp pain going through his L arm (R side was unaffected).  Pt states even holding a bowl of cereal with his L hand and bringing a spoon up to his mouth with his R hand brings on pain. Pt states there is a constant pain in his L cervical spine (6/10 pain), but if he moves certain ways a sharp pain radiates throughout his L UE. Pain at worst in past week has been 8/10 (aggravating activities include turning head either direction, coughing, sitting up in bed from lying on back). Pain at best is 5/10 (eases pain by massaging neck). Past Medical History/Comorbidities:   Mr. Fritz Camejo  has a past medical history of Chronic kidney disease (CKD), stage II (mild) (10/13/2010); Essential hypertension, malignant (10/11/2010); Hypertension; Morbid obesity ([facility] [address] (10/11/2010); EILEEN (obstructive sleep apnea) (10/11/2010); EILEEN (obstructive sleep apnea) (10/11/2010); Primary pulmonary hypertension ([facility] [address] (10/11/2010); Severe uncontrolled hypertension (10/11/2010); and Tobacco abuse (10/11/2010). Mr. Fritz Camejo  has no past surgical history on file. Social History/Living Environment:    lives with mom (but pt states he is able to do all chores, the pain is just constant)  Prior Level of Function/Work/Activity:  Works in [employer] on [employer] (has difficulty backing up due to being unable to turn his head without pain); Dominant Side:         RIGHT  Other Clinical Tests:          X-ray of cervical spine  Previous Treatment Approaches:          Tried pain medication and muscle relaxers - did not help  Personal Factors:          Sex:  male        Age:  44 y.o. Current Medications:    Current Outpatient Prescriptions:     methylPREDNISolone (MEDROL DOSEPACK) 4 mg tablet, Take as directed in package. , Disp: 1 Dose Pack, Rfl: 0    sildenafil citrate (VIAGRA) 50 mg tablet, Take 1 Tab by mouth as needed. Indications: Erectile Dysfunction, Disp: 9 Tab, Rfl: 11    carisoprodol (SOMA) 350 mg tablet, Take 1 Tab by mouth every eight (8) hours as needed for Muscle Spasm(s).  Max Daily Amount: 1,050 mg., Disp: 21 Tab, Rfl: 0    cloNIDine HCl (CATAPRES) 0.1 mg tablet, Take 1 Tab by mouth two (2) times a day. Indications: Hypertension, Disp: 30 Tab, Rfl: 0    diclofenac potassium (CATAFLAM) 50 mg tablet, Take 1 Tab by mouth three (3) times daily. , Disp: 21 Tab, Rfl: 0    hydrocortisone (ANUSOL-HC) 2.5 % rectal cream, Insert  into rectum four (4) times daily. , Disp: 30 g, Rfl: 0    lisinopril-hydrochlorothiazide (PRINZIDE, ZESTORETIC) 20-12.5 mg per tablet, Take 2 Tabs by mouth daily. Indications: HYPERTENSION, Disp: 180 Tab, Rfl: 3    amLODIPine (NORVASC) 10 mg tablet, Take 1 Tab by mouth daily.  Indications: HYPERTENSION, Disp: 90 Tab, Rfl: 3   Date Last Reviewed:  8/2/2017   # of Personal Factors/Comorbidities that affect the Plan of Care: 1-2: MODERATE COMPLEXITY   EXAMINATION:   Initial assessment on 7/19/2017  Observation/Orthostatic Postural Assessment:          Kyphotic posture, forward head, rounded shoulders, posterior pelvic tilt in sitting  Palpation:          Significant tightness and tenderness throughout B upper traps/deltoids and levator scapulae; stiffness with central PA mobilizations noted throughout thoracic and cervical spine  ROM:    Initial measurement: (on 7/19/2017) Initial measurement: (on 7/19/2017) Reassessment measurement:  Reassessment measurement:    L UE AROM:  Shoulder flexion: [facility]  Shoulder abduction: WFL, but pain at end range in L cervical spine  Shoulder extension: [facility]  Shoulder ER: [facility]  Shoulder IR: level of T11 (pain in L upper trap radiating down L UE to elbow)    R UE AROM:  Shoulder flexion: [facility]  Shoulder abduction: [facility]  Shoulder extension: [facility]  Shoulder ER: [facility]  Shoulder IR: level of T8        Initial measurement: (on 7/19/2017) Reassessment measurement: Reassessment measurement:    Cervical AROM  Flexion (% of normal): 70% (pain in L cervical spine)  Extension (% of normal): 30% (slight pain in L cervical spine)  L sidebending (% of normal): 50% (pain in L cervical spine with radiating pain into L upper trap)  R sidebending (% of normal): 70% (slight pain in L cervical spine)  L rotation (% of normal): 80% (pain in L cervical spine)  R rotation (% of normal): 85% (slight pain in L cervical spine)    Thoracic AROM  Flexion (% of normal): 80% (pain in L cervical spine)  Extension (% of normal):  50%   L sidebending (% of normal): 100%  R sidebending (% of normal): 100%  L rotation (% of normal): 100%  R rotation (% of normal): 100%            Strength:    Initial measurement: (on 7/19/2017) Initial measurement: (on 7/19/2017) Reassessment measurement:  Reassessment measurement:    L UE:  Shoulder flexion: 4/5 (slight pain in L upper trap)  Shoulder extension: 5/5  Shoulder abduction: 4/5  Shoulder adduction: 5/5  Shoulder IR: 5/5  Shoulder ER: 4/5  Elbow flexion: 5/5  Elbow extension: 5/5  Wrist flexion/extension: 5/5 R UE:  Shoulder flexion: 4+/5  Shoulder extension: 5/5  Shoulder abduction: 5/5  Shoulder adduction: 5/5  Shoulder IR: 5/5  Shoulder ER: 4+/5  Elbow flexion: 5/5  Elbow extension: 5/5  Wrist flexion/extension: 5/5       Special Tests:          Cervical compression/distraction: -  Spurlings: + on L for radiating pain down L upper trap, - on R  Neurological Screen:        Myotomes:  WFL        Dermatomes:  Slightly decreased sensation at C4 on L side     Body Structures Involved:  1. Nerves  2. Bones  3. Joints  4. Muscles  5. Ligaments Body Functions Affected:  1. Sensory/Pain  2. Neuromusculoskeletal  3. Movement Related Activities and Participation Affected:  1. General Tasks and Demands  2. Mobility  3. Self Care  4. Domestic Life  5. Interpersonal Interactions and Relationships  6. Community, Social and [place]   # of elements that affect the Plan of Care: 4+: HIGH COMPLEXITY   CLINICAL PRESENTATION:   Presentation: Evolving clinical presentation with changing clinical characteristics: MODERATE COMPLEXITY   CLINICAL DECISION MAKING:   Outcome Measure:    Tool Used: Neck Disability Index (NDI)  Score:  Initial: 18/50  Most Recent: X/50 (Date: -- )   Interpretation of Score: The Neck Disability Index is a revised form of the Oswestry Low Back Pain Index and is designed to measure the activities of daily living in person's with neck pain. Each section is scored on a 0-5 scale, 5 representing the greatest disability. The scores of each section are added together for a total score of 50. Score 0 1-10 11-20 21-30 31-40 41-49 50   Modifier CH CI CJ CK CL CM CN     Payor: BLUE CROSS / Plan: [place] BLUE CROSS [facility] / Product Type: PPO /   Medical Necessity:   · Patient is expected to demonstrate progress in strength, range of motion and functional technique to improve ability to perform pain-free ADLs/IADLs and to improve overall quality of life. · Patient demonstrates good rehab potential due to higher previous functional level. Reason for Services/Other Comments:  · Patient continues to require modification of therapeutic interventions to increase complexity of exercises. Use of outcome tool(s) and clinical judgement create a POC that gives a: Questionable prediction of patient's progress: MODERATE COMPLEXITY   TREATMENT:   (In addition to Assessment/Re-Assessment sessions the following treatments were rendered)  Subjective comments at beginning of session: Pt reports pain continues in left upper trap/neck area and left upper back. Patient reports pain level is 5/10 today. Patient states that he was sore along the scapular border the day after last visit but it resolved and patient reported he worked without increased pain that night. Patient reports he is some better with improved mobility but patient still with guarded cervical movements. Therapeutic Exercise: ( 15 minutes):  Exercises per grid below to improve mobility, strength and dynamic movement of neck - both sides to improve functional lifting, carrying, reaching and overhead activites.   Required minimal visual, verbal and manual cues to promote proper body alignment, promote proper body posture and promote proper body mechanics. Progressed resistance, range, repetitions and complexity of movement as indicated. Date:  7/26/2017 Date:  7/28/17 Date:  7/31/17   Activity/Exercise Parameters Parameters    UBE L2 resistance; 4 minutes forward and 2 minutes backward to increase strength/endurance Level 2   X 4 minutes forward x   Lateral cervical flexion in sitting To R side only x 30 seconds x 3 reps with cues to only go within pain-free ROM  5 x 10 second hold B   Cervical rotation  5 x 5 second hold B in comfortable range   5 x 5 second hold B in comfortable range   Cervical flexion and extension  5 x 5 second hold in comfortable range   5 x 5 second hold in comfortable range   Cervical side bend  5 x 5 second hold B in comfortable range 5 x 5 second hold B in comfortable range                 MODALITIES: (20 minutes total) patient sitting with left arm supported for moist heat to left upper trap area prior to manual for 10 minutes to help decrease pain and tightness. After manual patient sitting with arm supported for ice pack x 10 minutes to left neck, upper trap and upper left back, scapula area. MANUAL THERAPY: (30 minutes): Patient sitting with left arm supported for STM with hands and guasha tools to left upper trap and upper back and then with weighted bar trigger point release to right upper trap area and then patient prone for STM with guasha tools, weight bar and hands to scapular border and upper back . Weight bar used with rolling technique and with end of bar for trigger point release in 2 point tender areas, one along the scapular border Patient tolerated all this well for decrease tightness and pain and improved mobility without increased pain and with improved mobility. Continue per plan of care.      Treatment/Session Assessment:  Pt tolerated treatment well but continues to report pain and tenderness along upper trap/levator area and mid scapular border. Patient with improved cervical mobility following treatment but still limited and guarded. Continue per plan of care. · Pain/ Symptoms: Initial:   4/10 Post Session:  Pain level the same but neck movement improved. ·   Compliance with Program/Exercises: Will assess as treatment progresses. · Recommendations/Intent for next treatment session: \"Next visit will focus on advancements to more challenging activities and reduction in assistance provided\".   Total Treatment Duration:  PT Patient Time In/Time Out  Time In: 1030  Time Out: R Pauline Gutierrez 119, PTA

## 2017-08-09 ENCOUNTER — HOSPITAL ENCOUNTER (OUTPATIENT)
Dept: PHYSICAL THERAPY | Age: 37
Discharge: HOME | End: 2017-08-09
Attending: FAMILY MEDICINE
Payer: COMMERCIAL

## 2017-08-09 PROCEDURE — 97140 MANUAL THERAPY 1/> REGIONS: CPT

## 2017-08-09 NOTE — PROGRESS NOTES
Dewey Gonzalez  : 1980 [facility] [address]  [phone]   [phone]       OUTPATIENT PHYSICAL THERAPY:Daily Note 2017    ICD-10: Treatment Diagnosis: Cervicalgia (M54.2)         Pain in left arm (M79.602)  Precautions/Allergies:   Review of patient's allergies indicates no known allergies. Fall Risk Score: 1 (? 5 = High Risk)  MD Orders: Evaluate and treat MEDICAL/REFERRING DIAGNOSIS:  Neck pain [M54.2]   DATE OF ONSET: 3 weeks prior to initial eval  REFERRING PHYSICIAN: Fady Torres MD  RETURN PHYSICIAN APPOINTMENT: unspecified   This section established at most recent assessment  INITIAL ASSESSMENT:  Azael Argueta is a 44 y.o. male who presents to physical therapy for sudden onset of cervical pain that began about 3 weeks prior to initial eval. This session, he demonstrated slightly decreased L shoulder strength/endurance, decreased cervical mobility with associated pain, multiple postural deficits, and decreased functional mobility as evident by a score of 18/50 on the Neck Disability Index (with higher scores indicating increased disability). Pt may benefit from skilled PT to address the above listed deficits to improve ability to perform pain-free ADLs/IADLs and to improve overall quality of life prior to discharge. PROBLEM LIST (Impacting functional limitations):  1. Decreased Strength  2. Decreased ADL/Functional Activities  3. Increased Pain  4. Decreased Activity Tolerance  5. Decreased Flexibility/Joint Mobility  6. Edema/Girth INTERVENTIONS PLANNED:  1. Balance Exercise  2. Bed Mobility  3. Cold  4. Electrical Stimulation  5. Family Education  6. Heat  7. Home Exercise Program (HEP)  8. Manual Therapy  9. Neuromuscular Re-education/Strengthening  10. Range of Motion (ROM)  11. Therapeutic Activites  12. Therapeutic Exercise/Strengthening  13.  Transcutaneous Electrical Nerve Stimulation (TENS)  14. Transfer Training  15. Ultrasound (US)   TREATMENT PLAN:  Effective Dates: 7/19/2017 TO 10/19/2017. Frequency/Duration: 2 times a week for 4 weeks  GOALS: (Goals have been discussed and agreed upon with patient.)  Discharge Goals: Time Frame: 4 weeks  Pt will be compliant and independent with HEP in order to increase cervical mobility and UE strength/endurance/mobility to improve functional mobility and overall quality of life. Pt will reduce score on Neck Disability Index to 10/50 in order to demonstrate improved function during activities of daily living. Pt will demonstrate increase in B cervical rotation AROM to 90% or more of normal with minimal to no increase in pain in order to improve functional mobility and improve ability to turn head while driving. Pt will report decrease in average pain level to 3/10 pain or less in order to demonstrate improved quality of life. Rehabilitation Potential For Stated Goals: Good  Regarding Bulmaro Gonzalez's therapy, I certify that the treatment plan above will be carried out by a therapist or under their direction. Thank you for this referral,  Annelise Menon DPT     Referring Physician Signature: Leigh Sanchez MD              Date                    HISTORY:   History of Present Injury/Illness (Reason for Referral): *History per pt or pt's family except where otherwise noted  Pt states he was in the shower about 3 weeks ago when he reached back with his R arm \"too fast\" and felt a pop. Pt states the pain continued for several days and was getting worse, so he went to the ER (didn't do anything according to pt), but went to MD next (who did x-rays), who said that it was \"whiplash\". Pt states pain medication and muscle relaxers were not helping, so that is when he was referred to physical therapy. Pt states sleeping is painful, and he cannot turn his head or cough without sharp pain going through his L arm (R side was unaffected).  Pt states even holding a bowl of cereal with his L hand and bringing a spoon up to his mouth with his R hand brings on pain. Pt states there is a constant pain in his L cervical spine (6/10 pain), but if he moves certain ways a sharp pain radiates throughout his L UE. Pain at worst in past week has been 8/10 (aggravating activities include turning head either direction, coughing, sitting up in bed from lying on back). Pain at best is 5/10 (eases pain by massaging neck). Past Medical History/Comorbidities:   Mr. Leni Rodriguez  has a past medical history of Chronic kidney disease (CKD), stage II (mild) (10/13/2010); Essential hypertension, malignant (10/11/2010); Hypertension; Morbid obesity ([facility] [address] (10/11/2010); EILEEN (obstructive sleep apnea) (10/11/2010); EILEEN (obstructive sleep apnea) (10/11/2010); Primary pulmonary hypertension ([facility] [address] (10/11/2010); Severe uncontrolled hypertension (10/11/2010); and Tobacco abuse (10/11/2010). Mr. Leni Rodriguez  has no past surgical history on file. Social History/Living Environment:    lives with mom (but pt states he is able to do all chores, the pain is just constant)  Prior Level of Function/Work/Activity:  Works in [employer] on [address] (has difficulty backing up due to being unable to turn his head without pain); Dominant Side:         RIGHT  Other Clinical Tests:          X-ray of cervical spine  Previous Treatment Approaches:          Tried pain medication and muscle relaxers - did not help  Personal Factors:          Sex:  male        Age:  44 y.o. Current Medications:    Current Outpatient Prescriptions:     sildenafil citrate (VIAGRA) 50 mg tablet, Take 1 Tab by mouth as needed. Indications: Erectile Dysfunction, Disp: 9 Tab, Rfl: 11    methylPREDNISolone (MEDROL DOSEPACK) 4 mg tablet, Take as directed in package. , Disp: 1 Dose Pack, Rfl: 0    carisoprodol (SOMA) 350 mg tablet, Take 1 Tab by mouth every eight (8) hours as needed for Muscle Spasm(s).  Max Daily Amount: 1,050 mg., Disp: 21 Tab, Rfl: 0    cloNIDine HCl (CATAPRES) 0.1 mg tablet, Take 1 Tab by mouth two (2) times a day. Indications: Hypertension, Disp: 30 Tab, Rfl: 0    diclofenac potassium (CATAFLAM) 50 mg tablet, Take 1 Tab by mouth three (3) times daily. , Disp: 21 Tab, Rfl: 0    hydrocortisone (ANUSOL-HC) 2.5 % rectal cream, Insert  into rectum four (4) times daily. , Disp: 30 g, Rfl: 0    lisinopril-hydrochlorothiazide (PRINZIDE, ZESTORETIC) 20-12.5 mg per tablet, Take 2 Tabs by mouth daily. Indications: HYPERTENSION, Disp: 180 Tab, Rfl: 3    amLODIPine (NORVASC) 10 mg tablet, Take 1 Tab by mouth daily.  Indications: HYPERTENSION, Disp: 90 Tab, Rfl: 3   Date Last Reviewed:  8/9/2017   # of Personal Factors/Comorbidities that affect the Plan of Care: 1-2: MODERATE COMPLEXITY   EXAMINATION:   Initial assessment on 7/19/2017  Observation/Orthostatic Postural Assessment:          Kyphotic posture, forward head, rounded shoulders, posterior pelvic tilt in sitting  Palpation:          Significant tightness and tenderness throughout B upper traps/deltoids and levator scapulae; stiffness with central PA mobilizations noted throughout thoracic and cervical spine  ROM:    Initial measurement: (on 7/19/2017) Initial measurement: (on 7/19/2017) Reassessment measurement:  Reassessment measurement:    L UE AROM:  Shoulder flexion: [facility]  Shoulder abduction: WFL, but pain at end range in L cervical spine  Shoulder extension: [facility]  Shoulder ER: [facility]  Shoulder IR: level of T11 (pain in L upper trap radiating down L UE to elbow)    R UE AROM:  Shoulder flexion: [facility]  Shoulder abduction: [facility]  Shoulder extension: [facility]  Shoulder ER: [facility]  Shoulder IR: level of T8        Initial measurement: (on 7/19/2017) Reassessment measurement: Reassessment measurement:    Cervical AROM  Flexion (% of normal): 70% (pain in L cervical spine)  Extension (% of normal): 30% (slight pain in L cervical spine)  L sidebending (% of normal): 50% (pain in L cervical spine with radiating pain into L upper trap)  R sidebending (% of normal): 70% (slight pain in L cervical spine)  L rotation (% of normal): 80% (pain in L cervical spine)  R rotation (% of normal): 85% (slight pain in L cervical spine)    Thoracic AROM  Flexion (% of normal): 80% (pain in L cervical spine)  Extension (% of normal):  50%   L sidebending (% of normal): 100%  R sidebending (% of normal): 100%  L rotation (% of normal): 100%  R rotation (% of normal): 100%            Strength:    Initial measurement: (on 7/19/2017) Initial measurement: (on 7/19/2017) Reassessment measurement:  Reassessment measurement:    L UE:  Shoulder flexion: 4/5 (slight pain in L upper trap)  Shoulder extension: 5/5  Shoulder abduction: 4/5  Shoulder adduction: 5/5  Shoulder IR: 5/5  Shoulder ER: 4/5  Elbow flexion: 5/5  Elbow extension: 5/5  Wrist flexion/extension: 5/5 R UE:  Shoulder flexion: 4+/5  Shoulder extension: 5/5  Shoulder abduction: 5/5  Shoulder adduction: 5/5  Shoulder IR: 5/5  Shoulder ER: 4+/5  Elbow flexion: 5/5  Elbow extension: 5/5  Wrist flexion/extension: 5/5       Special Tests:          Cervical compression/distraction: -  Spurlings: + on L for radiating pain down L upper trap, - on R  Neurological Screen:        Myotomes:  WFL        Dermatomes:  Slightly decreased sensation at C4 on L side     Body Structures Involved:  1. Nerves  2. Bones  3. Joints  4. Muscles  5. Ligaments Body Functions Affected:  1. Sensory/Pain  2. Neuromusculoskeletal  3. Movement Related Activities and Participation Affected:  1. General Tasks and Demands  2. Mobility  3. Self Care  4. Domestic Life  5. Interpersonal Interactions and Relationships  6. Community, Social and [place]   # of elements that affect the Plan of Care: 4+: HIGH COMPLEXITY   CLINICAL PRESENTATION:   Presentation: Evolving clinical presentation with changing clinical characteristics: MODERATE COMPLEXITY   CLINICAL DECISION MAKING:   Outcome Measure:    Tool Used: Neck Disability Index (NDI)  Score:  Initial: 18/50  Most Recent: X/50 (Date: -- )   Interpretation of Score: The Neck Disability Index is a revised form of the Oswestry Low Back Pain Index and is designed to measure the activities of daily living in person's with neck pain. Each section is scored on a 0-5 scale, 5 representing the greatest disability. The scores of each section are added together for a total score of 50. Score 0 1-10 11-20 21-30 31-40 41-49 50   Modifier CH CI CJ CK CL CM CN     Payor: BLUE CROSS / Plan: [place] BLUE CROSS [facility] / Product Type: PPO /   Medical Necessity:   · Patient is expected to demonstrate progress in strength, range of motion and functional technique to improve ability to perform pain-free ADLs/IADLs and to improve overall quality of life. · Patient demonstrates good rehab potential due to higher previous functional level. Reason for Services/Other Comments:  · Patient continues to require modification of therapeutic interventions to increase complexity of exercises. Use of outcome tool(s) and clinical judgement create a POC that gives a: Questionable prediction of patient's progress: MODERATE COMPLEXITY   TREATMENT:   (In addition to Assessment/Re-Assessment sessions the following treatments were rendered)  Subjective comments at beginning of session: Pt states pain is improved with physical therapy, but then increases after several hours of working at [employer] due to turning head constantly    Therapeutic Exercise: ( ):  Exercises per grid below to improve mobility, strength and dynamic movement of neck - both sides to improve functional lifting, carrying, reaching and overhead activites. Required minimal visual, verbal and manual cues to promote proper body alignment, promote proper body posture and promote proper body mechanics. Progressed resistance, range, repetitions and complexity of movement as indicated.     Date:  7/28/17 Date:  7/31/17 Date:  8/9/2017 Activity/Exercise Parameters     UBE Level 2   X 4 minutes forward x    Lateral cervical flexion in sitting  5 x 10 second hold B    Cervical rotation 5 x 5 second hold B in comfortable range   5 x 5 second hold B in comfortable range    Cervical flexion and extension 5 x 5 second hold in comfortable range   5 x 5 second hold in comfortable range    Cervical side bend 5 x 5 second hold B in comfortable range 5 x 5 second hold B in comfortable range                  MODALITIES: (10 minutes) After manual patient sitting with L arm supported for ice pack x 10 minutes to left neck, upper trap and upper left back, scapula area to reduce pain and inflammation. MANUAL THERAPY: (40 minutes): Joint mobilization and Soft tissue mobilization was utilized and necessary because of the patient's restricted joint motion, loss of articular motion and restricted motion of soft tissue. With pt in supine position with B LEs supported on wedge, STM and trigger point release to L upper traps/deltoids, levator scapulae, and cervical/thoracic paraspinals and gentle manual cervical distraction to reduce muscular tightness and pain. With pt in prone position, central and L unilateral PA mobilizations (grade 2-3) performed to C6-7 followed by to reduce muscular tightness and pain. With pt in sitting position, L unilateral PA mobilization (grade 3-4) with and without mobilization into movement (cervical flexion/extension) to improve cervical mobility and reduce muscular tightness and pain. With pt in sitting position, inferior mobilization (grade 3-4) at L 1st rib with and without mobilization into movement (cervical rotation) to improve cervical mobility and reduce muscular tightness and pain.      Treatment/Session Assessment:  Pt able to demonstrate improved R cervical rotation with mobilizations into movement at L 1st rib this session, but this improvement did not last.  · Pain/ Symptoms: Initial:   5/10 Post Session:  Pain level the same ·   Compliance with Program/Exercises: Will assess as treatment progresses. · Recommendations/Intent for next treatment session: \"Next visit will focus on advancements to more challenging activities and reduction in assistance provided\".   Total Treatment Duration:  PT Patient Time In/Time Out  Time In: 1100  Time Out: 1206 Ilan Kwong, T

## 2017-08-14 ENCOUNTER — APPOINTMENT (OUTPATIENT)
Dept: PHYSICAL THERAPY | Age: 37
End: 2017-08-14
Attending: FAMILY MEDICINE
Payer: COMMERCIAL

## 2017-08-16 ENCOUNTER — APPOINTMENT (OUTPATIENT)
Dept: PHYSICAL THERAPY | Age: 37
End: 2017-08-16
Attending: FAMILY MEDICINE
Payer: COMMERCIAL

## 2017-10-16 NOTE — THERAPY DISCHARGE
Samara Gonzalez  : 1980 [facility] [address]  [phone]   [phone]       OUTPATIENT PHYSICAL THERAPY:Discontinuation Summary 10/16/2017    ICD-10: Treatment Diagnosis: Cervicalgia (M54.2)         Pain in left arm (M79.602)  Precautions/Allergies:   Review of patient's allergies indicates no known allergies. Fall Risk Score: 1 (? 5 = High Risk)  MD Orders: Evaluate and treat MEDICAL/REFERRING DIAGNOSIS:  Neck pain [M54.2]   DATE OF ONSET: 3 weeks prior to initial eval  REFERRING PHYSICIAN: Annamaria Adams MD  RETURN PHYSICIAN APPOINTMENT: unspecified   This section established at most recent assessment  INITIAL ASSESSMENT: Ritchie Landa has been seen in physical therapy from 2017 to 2017 for 6 visits (3 no shows). Treatment has been discontinued at this time due to Patient noncompliant. Goals were not reassessed secondary to pt failing to attend additional sessions. Thank you for this referral.        PROBLEM LIST (Impacting functional limitations):  1. Decreased Strength  2. Decreased ADL/Functional Activities  3. Increased Pain  4. Decreased Activity Tolerance  5. Decreased Flexibility/Joint Mobility  6. Edema/Girth INTERVENTIONS PLANNED:  1. Balance Exercise  2. Bed Mobility  3. Cold  4. Electrical Stimulation  5. Family Education  6. Heat  7. Home Exercise Program (HEP)  8. Manual Therapy  9. Neuromuscular Re-education/Strengthening  10. Range of Motion (ROM)  11. Therapeutic Activites  12. Therapeutic Exercise/Strengthening  13. Transcutaneous Electrical Nerve Stimulation (TENS)  14. Transfer Training  15. Ultrasound (US)   TREATMENT PLAN:  Effective Dates: 2017 TO 10/19/2017.   Frequency/Duration: 2 times a week for 4 weeks  GOALS: (Goals have been discussed and agreed upon with patient.)  Discharge Goals: Time Frame: 4 weeks  Pt will be compliant and independent with HEP in order to increase cervical mobility and UE strength/endurance/mobility to improve functional mobility and overall quality of life. (NOT REASSESSED 10/16/2017)  Pt will reduce score on Neck Disability Index to 10/50 in order to demonstrate improved function during activities of daily living. (NOT REASSESSED 10/16/2017)  Pt will demonstrate increase in B cervical rotation AROM to 90% or more of normal with minimal to no increase in pain in order to improve functional mobility and improve ability to turn head while driving. (NOT REASSESSED 10/16/2017)  Pt will report decrease in average pain level to 3/10 pain or less in order to demonstrate improved quality of life.  (NOT REASSESSED 10/16/2017)  Rehabilitation Potential For Stated Goals: Good    Thank you for this referral,  Arian Farah DPT